# Patient Record
Sex: FEMALE | Race: WHITE | NOT HISPANIC OR LATINO | Employment: UNEMPLOYED
[De-identification: names, ages, dates, MRNs, and addresses within clinical notes are randomized per-mention and may not be internally consistent; named-entity substitution may affect disease eponyms.]

---

## 2018-10-22 ENCOUNTER — HEALTH MAINTENANCE LETTER (OUTPATIENT)
Age: 23
End: 2018-10-22

## 2024-03-14 ENCOUNTER — OFFICE VISIT (OUTPATIENT)
Dept: URGENT CARE | Facility: URGENT CARE | Age: 29
End: 2024-03-14
Payer: COMMERCIAL

## 2024-03-14 VITALS
TEMPERATURE: 99.7 F | SYSTOLIC BLOOD PRESSURE: 134 MMHG | WEIGHT: 213 LBS | RESPIRATION RATE: 21 BRPM | OXYGEN SATURATION: 100 % | HEART RATE: 125 BPM | DIASTOLIC BLOOD PRESSURE: 91 MMHG

## 2024-03-14 DIAGNOSIS — J03.90 TONSILLITIS: Primary | ICD-10-CM

## 2024-03-14 DIAGNOSIS — J02.9 SORE THROAT: ICD-10-CM

## 2024-03-14 LAB — DEPRECATED S PYO AG THROAT QL EIA: NEGATIVE

## 2024-03-14 PROCEDURE — 99203 OFFICE O/P NEW LOW 30 MIN: CPT | Performed by: PHYSICIAN ASSISTANT

## 2024-03-14 PROCEDURE — 87651 STREP A DNA AMP PROBE: CPT | Performed by: PHYSICIAN ASSISTANT

## 2024-03-14 RX ORDER — GABAPENTIN 600 MG/1
600 TABLET ORAL 2 TIMES DAILY
COMMUNITY

## 2024-03-14 RX ORDER — DEXAMETHASONE SODIUM PHOSPHATE 4 MG/ML
10 VIAL (ML) INJECTION ONCE
Status: COMPLETED | OUTPATIENT
Start: 2024-03-14 | End: 2024-03-14

## 2024-03-14 RX ORDER — AMOXICILLIN 400 MG/5ML
875 POWDER, FOR SUSPENSION ORAL 2 TIMES DAILY
Qty: 218.8 ML | Refills: 0 | Status: SHIPPED | OUTPATIENT
Start: 2024-03-14 | End: 2024-03-24

## 2024-03-14 RX ORDER — LORAZEPAM 0.5 MG/1
0.5 TABLET ORAL EVERY 6 HOURS PRN
COMMUNITY
Start: 2023-07-25

## 2024-03-14 RX ADMIN — Medication 10 MG: at 19:12

## 2024-03-14 ASSESSMENT — ENCOUNTER SYMPTOMS
RESPIRATORY NEGATIVE: 1
LIGHT-HEADEDNESS: 0
NECK STIFFNESS: 0
DIZZINESS: 0
NECK PAIN: 0
PALPITATIONS: 0
BACK PAIN: 0
NAUSEA: 0
CARDIOVASCULAR NEGATIVE: 1
JOINT SWELLING: 0
WOUND: 0
SHORTNESS OF BREATH: 0
CHILLS: 0
ARTHRALGIAS: 0
ALLERGIC/IMMUNOLOGIC NEGATIVE: 1
HEADACHES: 0
DIARRHEA: 0
SORE THROAT: 1
WEAKNESS: 0
VOMITING: 0
MYALGIAS: 1
FEVER: 0
COUGH: 0
RHINORRHEA: 0
EYES NEGATIVE: 1
ENDOCRINE NEGATIVE: 1

## 2024-03-14 NOTE — PROGRESS NOTES
Chief Complaint:     Chief Complaint   Patient presents with    [facility]i - Strep     Sore throat, neck pains, body aches, cough, nasal congestion, fever. Sx 2-3 days.        Results for orders placed or performed in visit on 03/14/24   Streptococcus A Rapid Screen w/Reflex to PCR - Clinic Collect     Status: Normal    Specimen: Throat; Swab   Result Value Ref Range    Group A Strep antigen Negative Negative       Medical Decision Making:    Vital signs reviewed by Lev Madrid PA-C  BP (!) 134/91 (BP Location: Right arm, Cuff Size: Adult Large)   Pulse (!) 125   Temp 99.7  F (37.6  C) (Tympanic)   Resp 21   Wt 96.6 kg (213 lb)   SpO2 100%     Differential Diagnosis:  URI Adult/Peds:  Strep pharyngitis, Tonsilitis, Viral pharyngitis, Viral syndrome, and Viral upper respiratory illness        ASSESSMENT    1. Tonsillitis    2. Sore throat        PLAN    Patient is in no acute distress.    Temp is 99.7 in clinic today, lung sounds were clear, and O2 sats at 100% on RA.    RST was negative.  We will call with PCR results only if positive.  With symptoms, Rx for Amoxicillin sent in.    Patient given 10 Mg Decadron PO in clinic today.    Rest, Push fluids, vaporizer, elevation of head of bed.  Ibuprofen and or Tylenol for any fever or body aches.  If symptoms worsen, recheck immediately otherwise follow up with your PCP in 1 week if symptoms are not improving.  Worrisome symptoms discussed with instructions to go to the ED.  Patient verbalized understanding and agreed with this plan.    Labs:    Results for orders placed or performed in visit on 03/14/24   Streptococcus A Rapid Screen w/Reflex to PCR - Clinic Collect     Status: Normal    Specimen: Throat; Swab   Result Value Ref Range    Group A Strep antigen Negative Negative        Vital signs reviewed by Lev Madrid PA-C  BP (!) 134/91 (BP Location: Right arm, Cuff Size: Adult Large)   Pulse (!) 125   Temp 99.7  F (37.6  C) (Tympanic)   Resp 21   Wt 96.6  kg (213 lb)   SpO2 100%     Current Meds      Current Outpatient Medications:     amoxicillin (AMOXIL) 400 MG/5ML suspension, Take 10.94 mLs (875 mg) by mouth 2 times daily for 10 days, Disp: 218.8 mL, Rfl: 0    amphetamine-dextroamphetamine (ADDERALL) 15 MG tablet, Take 1 tablet by mouth 2 times daily., Disp: 8 tablet, Rfl: 0    diphenhydrAMINE (BENADRYL) 25 MG tablet, Take 25 mg by mouth every 6 hours as needed, Disp: , Rfl:     FLUoxetine (PROZAC) 40 MG capsule, Take 1 capsule by mouth daily., Disp: 90 capsule, Rfl: 3    gabapentin (NEURONTIN) 600 MG tablet, Take 600 mg by mouth 2 times daily, Disp: , Rfl:     IBUPROFEN 200 MG OR TABS, TAKE 1 TO 2 TABLETS EVERY 4 TO 6 HOURS AS NEEDED WITH FOOD, Disp: 100, Rfl: 0    LORazepam (ATIVAN) 0.5 MG tablet, Take 0.5 mg by mouth every 6 hours as needed, Disp: , Rfl:     medroxyPROGESTERone (DEPO-PROVERA) 150 MG/ML injection, Inject 1 mL into the muscle every 3 months. (Patient not taking: Reported on 3/14/2024), Disp: 1 mL, Rfl: 3    MELATONIN PO, Take 5 mg by mouth nightly as needed (Patient not taking: Reported on 3/14/2024), Disp: , Rfl:     traZODone (DESYREL) 150 MG tablet, Take 1 tablet (150 mg) by mouth At Bedtime (Patient not taking: Reported on 3/14/2024), Disp: 30 tablet, Rfl: 0    triamcinolone (KENALOG) 0.1 % cream, Apply sparingly to affected area three times daily as needed (Patient not taking: Reported on 3/14/2024), Disp: 80 g, Rfl: 0  No current facility-administered medications for this visit.      Respiratory History    occasional episodes of bronchitis      SUBJECTIVE    HPI: Ruth Rojas is an 28 year old female who presents with aching and sore throat.  Symptoms began 3  days ago and has gradually worsening.  There is no shortness of breath, wheezing, and chest pain.  Patient is eating and drinking well.  No fever, nausea, vomiting, or diarrhea.    Patient denies any recent travel or exposure to known COVID positive tested individual.       Patient is new to [facility].    ROS:     Review of Systems   Constitutional:  Negative for chills and fever.   HENT:  Positive for sore throat. Negative for congestion, ear pain and rhinorrhea.    Eyes: Negative.    Respiratory: Negative.  Negative for cough and shortness of breath.    Cardiovascular: Negative.  Negative for chest pain and palpitations.   Gastrointestinal:  Negative for diarrhea, nausea and vomiting.   Endocrine: Negative.    Genitourinary: Negative.    Musculoskeletal:  Positive for myalgias. Negative for arthralgias, back pain, joint swelling, neck pain and neck stiffness.   Skin: Negative.  Negative for rash and wound.   Allergic/Immunologic: Negative.  Negative for immunocompromised state.   Neurological:  Negative for dizziness, weakness, light-headedness and headaches.         Family History   Family History   Problem Relation Age of Onset    Psychotic Disorder Mother         bipolar    Unknown/Adopted Mother         unknown/foster care    Unknown/Adopted Father         Problem history  Patient Active Problem List   Diagnosis    Attention deficit hyperactivity disorder (ADHD)    Headache    Foster care (status)    Mild major depression (H)    Generalized anxiety disorder    ODD (oppositional defiant disorder)    Alcohol use    Marijuana abuse    Insomnia    Deliberate self-cutting    Depression        Allergies  No Known Allergies     Social History  Social History     Socioeconomic History    Marital status: Single     Spouse name: Not on file    Number of children: Not on file    Years of education: Not on file    Highest education level: Not on file   Occupational History    Not on file   Tobacco Use    Smoking status: Passive Smoke Exposure - Never Smoker    Smokeless tobacco: Never   Substance and Sexual Activity    Alcohol use: No     Comment: not recently 12/5/11    Drug use: No     Comment: not recently 12/5/11    Sexual activity: Not Currently   Other Topics Concern    Not  on file   Social History Narrative    Not on file     Social Determinants of Health     Financial Resource Strain: Not on file   Food Insecurity: Not on file   Transportation Needs: Not on file   Physical Activity: Not on file   Stress: Not on file   Social Connections: Not on file   Interpersonal Safety: Not on file   Housing Stability: Not on file        OBJECTIVE     Vital signs reviewed by Lev Madrid PA-C  BP (!) 134/91 (BP Location: Right arm, Cuff Size: Adult Large)   Pulse (!) 125   Temp 99.7  F (37.6  C) (Tympanic)   Resp 21   Wt 96.6 kg (213 lb)   SpO2 100%      Physical Exam  Vitals and nursing note reviewed.   Constitutional:       General: She is not in acute distress.     Appearance: She is well-developed. She is not ill-appearing, toxic-appearing or diaphoretic.   HENT:      Head: Normocephalic and atraumatic.      Right Ear: Hearing, tympanic membrane, ear canal and external ear normal. Tympanic membrane is not perforated, erythematous, retracted or bulging.      Left Ear: Hearing, tympanic membrane, ear canal and external ear normal. Tympanic membrane is not perforated, erythematous, retracted or bulging.      Nose: Congestion present. No mucosal edema or rhinorrhea.      Right Sinus: No maxillary sinus tenderness or frontal sinus tenderness.      Left Sinus: No maxillary sinus tenderness or frontal sinus tenderness.      Mouth/Throat:      Pharynx: Posterior oropharyngeal erythema present. No pharyngeal swelling, oropharyngeal exudate or uvula swelling.      Tonsils: Tonsillar exudate present. No tonsillar abscesses. 3+ on the right. 3+ on the left.   Eyes:      General:         Right eye: No discharge.         Left eye: No discharge.      Pupils: Pupils are equal, round, and reactive to light.   Cardiovascular:      Rate and Rhythm: Normal rate and regular rhythm.      Heart sounds: Normal heart sounds. No murmur heard.     No friction rub. No gallop.   Pulmonary:      Effort: Pulmonary  effort is normal. No respiratory distress.      Breath sounds: Normal breath sounds. No decreased breath sounds, wheezing, rhonchi or rales.   Chest:      Chest wall: No tenderness.   Abdominal:      General: Bowel sounds are normal. There is no distension.      Palpations: Abdomen is soft. There is no mass.      Tenderness: There is no abdominal tenderness. There is no guarding.   Musculoskeletal:      Cervical back: Normal range of motion and neck supple.   Lymphadenopathy:      Head:      Right side of head: No submental, submandibular, tonsillar, preauricular or posterior auricular adenopathy.      Left side of head: No submental, submandibular, tonsillar, preauricular or posterior auricular adenopathy.      Cervical: No cervical adenopathy.      Right cervical: No superficial or posterior cervical adenopathy.     Left cervical: No superficial or posterior cervical adenopathy.   Skin:     General: Skin is warm and dry.      Findings: No rash.   Neurological:      Mental Status: She is alert and oriented to person, place, and time.      Cranial Nerves: No cranial nerve deficit.      Deep Tendon Reflexes: Reflexes are normal and symmetric.   Psychiatric:         Behavior: Behavior normal. Behavior is cooperative.         Thought Content: Thought content normal.         Judgment: Judgment normal.           Lev Madrid PA-C  3/14/2024, 6:39 PM

## 2024-03-15 LAB — GROUP A STREP BY PCR: NOT DETECTED
